# Patient Record
Sex: FEMALE | Race: BLACK OR AFRICAN AMERICAN | NOT HISPANIC OR LATINO | Employment: FULL TIME | URBAN - METROPOLITAN AREA
[De-identification: names, ages, dates, MRNs, and addresses within clinical notes are randomized per-mention and may not be internally consistent; named-entity substitution may affect disease eponyms.]

---

## 2017-12-21 DIAGNOSIS — Z36.89 ENCOUNTER FOR FETAL ANATOMIC SURVEY: Primary | ICD-10-CM

## 2022-02-13 ENCOUNTER — HOSPITAL ENCOUNTER (EMERGENCY)
Facility: HOSPITAL | Age: 37
Discharge: HOME | End: 2022-02-13
Attending: EMERGENCY MEDICINE
Payer: COMMERCIAL

## 2022-02-13 VITALS
HEART RATE: 83 BPM | HEIGHT: 66 IN | DIASTOLIC BLOOD PRESSURE: 79 MMHG | BODY MASS INDEX: 35.08 KG/M2 | SYSTOLIC BLOOD PRESSURE: 152 MMHG | WEIGHT: 218.25 LBS | TEMPERATURE: 98 F | RESPIRATION RATE: 16 BRPM | OXYGEN SATURATION: 100 %

## 2022-02-13 DIAGNOSIS — R03.0 ELEVATED BLOOD PRESSURE READING: Primary | ICD-10-CM

## 2022-02-13 DIAGNOSIS — M25.562 KNEE PAIN, LEFT: ICD-10-CM

## 2022-02-13 LAB — HEP C VIRUS HOLD SPECIMEN: NORMAL

## 2022-02-13 PROCEDURE — 25000003 PHARM REV CODE 250: Mod: ER | Performed by: EMERGENCY MEDICINE

## 2022-02-13 PROCEDURE — 99284 EMERGENCY DEPT VISIT MOD MDM: CPT | Mod: 25,ER

## 2022-02-13 PROCEDURE — 29505 APPLICATION LONG LEG SPLINT: CPT | Mod: LT,ER

## 2022-02-13 PROCEDURE — 87389 HIV-1 AG W/HIV-1&-2 AB AG IA: CPT | Performed by: EMERGENCY MEDICINE

## 2022-02-13 RX ORDER — IBUPROFEN 600 MG/1
600 TABLET ORAL EVERY 6 HOURS PRN
Qty: 40 TABLET | Refills: 0 | OUTPATIENT
Start: 2022-02-13 | End: 2022-06-09

## 2022-02-13 RX ORDER — KETOROLAC TROMETHAMINE 10 MG/1
10 TABLET, FILM COATED ORAL
Status: COMPLETED | OUTPATIENT
Start: 2022-02-13 | End: 2022-02-13

## 2022-02-13 RX ADMIN — KETOROLAC TROMETHAMINE 10 MG: 10 TABLET, FILM COATED ORAL at 08:02

## 2022-02-13 NOTE — ED PROVIDER NOTES
History     Chief Complaint   Patient presents with    Knee Pain     Mihir knee pain, left >right       Review of patient's allergies indicates:   Allergen Reactions    Codeine Anaphylaxis    Hydrocodone-acetaminophen Swelling     THROAT CLOSURE  THROAT CLOSURE      Oxycodone-acetaminophen Swelling     THROAT CLOSURE  THROAT CLOSURE         History of Present Illness   HPI    2/13/2022, 7:20 AM  The history is provided by the patient    Alpa Oliveros is a 36 y.o. female presenting to the ED for bilateral knee pain, (L>R).  Patient reports that she was attempting to get up and work when her left leg gave out.  She denies falling.  The pain is located mostly to the left knee.  Although she complains of bilateral knee pain.  Pain is rated 8/10.  No prior treatment.  Trying to bend the knee makes it worse.  Keeping it straight makes better.  Patient denies any back pain, calf pain, calf tenderness,  loss of control of bowel or bladder, fever, chest pain, chest pressure, nausea, vomiting.      Arrival mode:  Personal Vehicle    PCP: Neftaly Barker MD     Allergies:  Review of patient's allergies indicates:   Allergen Reactions    Codeine Anaphylaxis    Hydrocodone-acetaminophen Swelling     THROAT CLOSURE  THROAT CLOSURE      Oxycodone-acetaminophen Swelling     THROAT CLOSURE  THROAT CLOSURE         Past Medical History:  History reviewed. No pertinent past medical history.    Past Surgical History:  Past Surgical History:   Procedure Laterality Date    hysterectomy      INCISIONAL HERNIA REPAIR           Family History:  History reviewed. No pertinent family history.    Social History:  Social History     Tobacco Use    Smoking status: Never Smoker    Smokeless tobacco: Not on file   Substance and Sexual Activity    Alcohol use: Never    Drug use: Never    Sexual activity: Not on file        Review of Systems   Review of Systems   Constitutional: Negative for fever.   HENT: Negative for sore throat.     Respiratory: Negative for shortness of breath.    Cardiovascular: Negative for chest pain.   Gastrointestinal: Negative for nausea.   Genitourinary: Negative for dysuria.   Musculoskeletal: Positive for arthralgias (Bilateral knee pain). Negative for back pain and neck pain.   Skin: Negative for rash.   Neurological: Negative for weakness.   Hematological: Does not bruise/bleed easily.          Physical Exam     Initial Vitals [02/13/22 0729]   BP Pulse Resp Temp SpO2   (!) 152/79 83 16 98.1 °F (36.7 °C) 100 %      MAP       --          Physical Exam  Musculoskeletal:      Left upper leg: No swelling or deformity.      Right knee: Bony tenderness present. No swelling or ecchymosis. Normal range of motion. No tenderness. No LCL laxity, MCL laxity, ACL laxity or PCL laxity. Normal pulse.      Left knee: Bony tenderness and crepitus present. No swelling, deformity, effusion, erythema or ecchymosis. Decreased range of motion (Painful to flex the left knee). Tenderness present over the lateral joint line. LCL laxity present.      Right ankle: Normal.      Right Achilles Tendon: Normal.      Left ankle: Normal. No deformity. No tenderness. Normal range of motion.      Left Achilles Tendon: Normal.      Left foot: Normal. No swelling or deformity. Normal pulse.         Nursing Notes and Vital Signs Reviewed.  Constitutional: Patient is in no apparent distress. Well-developed and well-nourished.  Head: Atraumatic. Normocephalic.  Eyes: PERRL. EOM intact. Conjunctivae are not pale. No scleral icterus.  ENT: Mucous membranes are moist. Oropharynx is clear and symmetric.    Neck: Supple. Full ROM. No lymphadenopathy.  Cardiovascular: Regular rate. Regular rhythm. No murmurs, rubs, or gallops. Distal pulses are 2+ and symmetric.  Pulmonary/Chest: No respiratory distress. Clear to auscultation bilaterally. No wheezing or rales.  Abdominal: Soft and non-distended.  There is no tenderness.  No rebound, guarding, or rigidity.  "Good bowel sounds.  Genitourinary: No CVA tenderness  Musculoskeletal: Moves all extremities. No obvious deformities. No edema. No calf tenderness.  Patient walks with an antalgic gait.  Skin: Warm and dry.  Neurological:  Alert, awake, and appropriate.  Normal speech.  No acute focal neurological deficits are appreciated.  Psychiatric: Normal affect. Good eye contact. Appropriate in content.     ED Course     ED Procedures:  Orthopedic Injury    Date/Time: 2/13/2022 9:29 AM  Performed by: Kasandra Dai DO  Authorized by: Kasandra Dai DO     Location procedure was performed:  [facility] EMERGENCY DEPARTMENT  Injury:     Injury location:  Knee    Location details:  Left knee      Pre-procedure assessment:     Neurovascular status: Neurovascularly intact      Distal perfusion: normal      Neurological function: normal      Range of motion: reduced        Selections made in this section will also lock the Injury type section above.:     Immobilization:  Splint    Splint type: Commercial Knee Immobilizer.  Post-procedure assessment:     Neurovascular status: Neurovascularly intact      Distal perfusion: normal      Neurological function: normal      Range of motion: splinted      Patient tolerance:  Patient tolerated the procedure well with no immediate complications        ED Vital Signs:  Vitals:    02/13/22 0729   BP: (!) 152/79   Pulse: 83   Resp: 16   Temp: 98.1 °F (36.7 °C)   TempSrc: Oral   SpO2: 100%   Weight: 99 kg (218 lb 4.1 oz)   Height: 5' 6" (1.676 m)       Abnormal Lab Results:  Labs Reviewed   HIV 1 / 2 ANTIBODY   HEPATITIS C ANTIBODY   HEP C VIRUS HOLD SPECIMEN        All Lab Results:    none      Imaging Results:  Imaging Results          CT Knee Without Contrast Left (Final result)  Result time 02/13/22 09:31:16    Final result by Sameer Brown MD (02/13/22 09:31:16)                 Impression:      1.  Negative for acute fracture involving the left knee, especially the lateral left " femur.    2.  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    3.  Moderate degenerative changes of the patellofemoral compartment with significant lateral tilting of the patella, as well as subchondral cyst in the patella.  Small adjacent effusion.    All CT scans at this facility are performed  using dose modulation techniques as appropriate to performed exam including the following:  automated exposure control; adjustment of mA and/or kV according to the patients size (this includes techniques or standardized protocols for targeted exams where dose is matched to indication/reason for exam: i.e. extremities or head);  iterative reconstruction technique.      Electronically signed by: Sameer Brown MD  Date:    02/13/2022  Time:    09:31             Narrative:    EXAMINATION:  CT KNEE WITHOUT CONTRAST LEFT    CLINICAL HISTORY:  Fracture, knee;No hx of trauma, left knee pain;    TECHNIQUE:  Axial images through the right knee were obtained without the use of IV contrast.  Sagittal and coronal reconstructions are provided for review.    COMPARISON:  Plain films performed earlier the same day    FINDINGS:  The lucency seen on the comparison plain films represent a well corticated accessory ossicle along the inferior margin of the patella.  The femur is intact.  No definite acute fracture is seen involving the distal femur, patella, proximal tibia or proximal fibula.    Moderate lateral tilting of the patella noted.  There is a tiny amount of fluid in the left knee joint.  Patellar spurring noted.  Subchondral cyst involves the patellar surface as well.    The surrounding soft tissues are normal.                               X-Ray Knee Complete 4 or More Views Left (Final result)  Result time 02/13/22 08:12:34    Final result by Ale Puckett MD (Timothy) (02/13/22 08:12:34)                  Impression:      See above      Electronically signed by: Ale Puckett MD  Date:    02/13/2022  Time:    08:12             Narrative:    EXAMINATION:  XR KNEE COMP 4 OR MORE VIEWS LEFT    CLINICAL HISTORY:  Pain in left knee    TECHNIQUE:  Standard radiography performed.    COMPARISON:  None    FINDINGS:  Linear lucency seen on the oblique film involving the lateral femoral condyle could represent a incomplete nondisplaced fracture.  Correlate for point tenderness.                                      The Emergency Provider reviewed the vital signs and test results, which are outlined above.     ED Discussion      9:47 AM  Reassessment: Dr. Dai reassessed the pt.  Pain is improved.  The pt is resting comfortably and is NAD.  Pt states their sx have improved. Discussed test results, shared treatment plan, specific conditions for return, and the need for f/u.  Answered their questions at this time.  Pt understands and agrees to the plan.  The pt has remained hemodynamically stable through ED course and is stable for discharge.      I discussed with patient and/or family/caretaker that evaluation in the ED does not suggest any emergent or life threatening medical conditions requiring immediate intervention beyond what was provided in the ED, and I believe patient is safe for discharge.  Regardless, an unremarkable evaluation in the ED does not preclude the development or presence of a serious of life threatening condition. As such, patient was instructed to return immediately for any worsening or change in current symptoms.      ED Medication(s):  Medications   ketorolac tablet 10 mg (10 mg Oral Given 2/13/22 0817)             MIPS Measures     Smoker? No     Hypertension: Pre-hypertension/Hypertension: The pt has been informed that they may have pre-hypertension or hypertension based on a blood pressure reading in the ED. I recommend that the pt call the PCP listed on their discharge instructions or a  "physician of their choice this week to arrange f/u for further evaluation of possible pre-hypertension or hypertension.        Medical Decision Making                 MDM  Reviewed: vitals and nursing note  Interpretation: x-ray and CT scan          Portions of this note may have been created with voice recognition software. Occasional "wrong-word" or "sound-a-like" substitutions may have occurred due to the inherent limitations of voice recognition software. Please, read the note carefully and recognize, using context, where substitutions have occurred.            Clinical Impression       ICD-10-CM ICD-9-CM   1. Elevated blood pressure reading  R03.0 796.2   2. Knee pain, left  M25.562 719.46         ED Disposition       Disposition: Discharge to home  Patient condition: Stable    Medication List     Medication List      START taking these medications    ibuprofen 600 MG tablet  Commonly known as: ADVIL,MOTRIN  Take 1 tablet (600 mg total) by mouth every 6 (six) hours as needed.           Where to Get Your Medications      You can get these medications from any pharmacy    Bring a paper prescription for each of these medications  · ibuprofen 600 MG tablet         ED Follow-up   Follow-up Information     Manuel Davis MD In 2 days.    Specialty: Orthopedic Surgery  Why: Return to emergency department for calf pain, calf tenderness, numbness or tingling of the toes, chest pain, chest pressure, shortness of breath, or other concerns  Contact information:  [address] [place] [address] [address] Kevyn [address]ARD [address] [phone]                                  Kasandra Dai, DO  02/13/22 0986       Kasandra Dai, DO  02/13/22 1024       Kasandra Dai, DO  03/05/22 1541    "

## 2022-02-14 ENCOUNTER — TELEPHONE (OUTPATIENT)
Dept: ORTHOPEDICS | Facility: CLINIC | Age: 37
End: 2022-02-14
Payer: COMMERCIAL

## 2022-02-14 LAB — HIV 1+2 AB+HIV1 P24 AG SERPL QL IA: NEGATIVE

## 2022-02-15 ENCOUNTER — TELEPHONE (OUTPATIENT)
Dept: ORTHOPEDICS | Facility: CLINIC | Age: 37
End: 2022-02-15
Payer: COMMERCIAL

## 2022-02-15 NOTE — TELEPHONE ENCOUNTER
Spoke with patient and scheduled her ER follow up appointment. Understanding verbalized of appointment date, time and location.      RE: Referral  Received: Today  DIGNA Bhagat MA  Wednesday or Friday           Previous Messages       ----- Message -----   From: Cori Garcia MA   Sent: 2/14/2022   4:17 PM CST   To: Serge Perez PA-C   Subject: FW: Referral                                     When should patient be scheduled?   ----- Message -----   From: Karen Fan   Sent: 2/14/2022  11:30 AM CST   To: Fabienne Oliveros - Ortho Trauma   Subject: Referral                                         Good morning,     I have received this orthopedic referral which needs to be seen in ortho trauma clinic. Can you please schedule this patient with your department? Please let me know if you need anything from me.     Thank you,   Karen Fan

## 2022-02-16 ENCOUNTER — OFFICE VISIT (OUTPATIENT)
Dept: ORTHOPEDICS | Facility: CLINIC | Age: 37
End: 2022-02-16
Payer: COMMERCIAL

## 2022-02-16 VITALS
HEART RATE: 85 BPM | HEIGHT: 63 IN | BODY MASS INDEX: 38.67 KG/M2 | DIASTOLIC BLOOD PRESSURE: 77 MMHG | WEIGHT: 218.25 LBS | SYSTOLIC BLOOD PRESSURE: 126 MMHG

## 2022-02-16 DIAGNOSIS — M22.2X1 BILATERAL PATELLOFEMORAL SYNDROME: Primary | ICD-10-CM

## 2022-02-16 DIAGNOSIS — M22.2X2 BILATERAL PATELLOFEMORAL SYNDROME: Primary | ICD-10-CM

## 2022-02-16 PROCEDURE — 99204 OFFICE O/P NEW MOD 45 MIN: CPT | Mod: S$GLB,,, | Performed by: PHYSICIAN ASSISTANT

## 2022-02-16 PROCEDURE — 99999 PR PBB SHADOW E&M-EST. PATIENT-LVL III: ICD-10-PCS | Mod: PBBFAC,,, | Performed by: PHYSICIAN ASSISTANT

## 2022-02-16 PROCEDURE — 99999 PR PBB SHADOW E&M-EST. PATIENT-LVL III: CPT | Mod: PBBFAC,,, | Performed by: PHYSICIAN ASSISTANT

## 2022-02-16 PROCEDURE — 99204 PR OFFICE/OUTPT VISIT, NEW, LEVL IV, 45-59 MIN: ICD-10-PCS | Mod: S$GLB,,, | Performed by: PHYSICIAN ASSISTANT

## 2022-02-16 NOTE — PROGRESS NOTES
"  Subjective:      Patient ID: Alpa Oliveros is a 36 y.o. female.    Chief Complaint: Pain of the Left Knee and Pain of the Right Knee      HPI: Alpa Oliveros is a 36-year-old female in clinic today for evaluation of bilateral knee pain.  Patient reports his pain has been ongoing for over a year now, but it has recently worsened.  She reports pain with going up and down stairs, standing from a seated position.  She reports that she feels cracking popping in her knees with flexion/extension.  No history of trauma associated with this injury.  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 No pertinent past medical history.    Current Outpatient Medications:     ibuprofen (ADVIL,MOTRIN) 600 MG tablet, Take 1 tablet (600 mg total) by mouth every 6 (six) hours as needed., Disp: 40 tablet, Rfl: 0  Review of patient's allergies indicates:   Allergen Reactions    Codeine Anaphylaxis    Hydrocodone-acetaminophen Swelling     THROAT CLOSURE  THROAT CLOSURE      Oxycodone-acetaminophen Swelling     THROAT CLOSURE  THROAT CLOSURE         /77 (BP Location: Right arm, Patient Position: Sitting, BP Method: Medium (Automatic))   Pulse 85   Ht 5' 3" (1.6 m)   Wt 99 kg (218 lb 4.1 oz)   BMI 38.66 kg/m²     ROS      Objective:    Ortho Exam   Bilateral knees:  No TTP  No edema  No effusion  ROM full  No laxity noted  Motor exam normal  Sensation pulses intact    GEN: Well developed, well nourished female. AAOX3. No acute distress.   Normocephalic, atraumatic.   ERNESTO  Breathing unlabored.  Mood and affect appropriate.        Assessment:     Imaging:  X-ray left knee obtained in the emergency department shows no acute fracture or dislocation. Patella is sitting further laterally        1. Bilateral patellofemoral syndrome          Plan:       Referral for therapy sent to [facility] in [place]. Reviewed radiographs with patient and explained there is no " fracture. Patient will follow-up as needed    Orders Placed This Encounter    Ambulatory referral/consult to Physical/Occupational Therapy     Follow up if symptoms worsen or fail to improve.

## 2022-06-09 ENCOUNTER — HOSPITAL ENCOUNTER (EMERGENCY)
Facility: HOSPITAL | Age: 37
Discharge: HOME | End: 2022-06-09
Attending: FAMILY MEDICINE
Payer: COMMERCIAL

## 2022-06-09 VITALS
RESPIRATION RATE: 16 BRPM | TEMPERATURE: 98 F | HEART RATE: 88 BPM | WEIGHT: 239.63 LBS | OXYGEN SATURATION: 100 % | SYSTOLIC BLOOD PRESSURE: 128 MMHG | HEIGHT: 62 IN | DIASTOLIC BLOOD PRESSURE: 66 MMHG | BODY MASS INDEX: 44.1 KG/M2

## 2022-06-09 DIAGNOSIS — M54.31 SCIATICA OF RIGHT SIDE: Primary | ICD-10-CM

## 2022-06-09 PROCEDURE — 96372 THER/PROPH/DIAG INJ SC/IM: CPT | Performed by: NURSE PRACTITIONER

## 2022-06-09 PROCEDURE — 63600175 PHARM REV CODE 636 W HCPCS: Performed by: NURSE PRACTITIONER

## 2022-06-09 PROCEDURE — 99284 EMERGENCY DEPT VISIT MOD MDM: CPT

## 2022-06-09 RX ORDER — KETOROLAC TROMETHAMINE 30 MG/ML
60 INJECTION, SOLUTION INTRAMUSCULAR; INTRAVENOUS
Status: COMPLETED | OUTPATIENT
Start: 2022-06-09 | End: 2022-06-09

## 2022-06-09 RX ORDER — METHOCARBAMOL 750 MG/1
750 TABLET, FILM COATED ORAL 4 TIMES DAILY
Qty: 40 TABLET | Refills: 0 | Status: SHIPPED | OUTPATIENT
Start: 2022-06-09 | End: 2022-06-19

## 2022-06-09 RX ORDER — ORPHENADRINE CITRATE 30 MG/ML
60 INJECTION INTRAMUSCULAR; INTRAVENOUS
Status: COMPLETED | OUTPATIENT
Start: 2022-06-09 | End: 2022-06-09

## 2022-06-09 RX ORDER — DEXAMETHASONE SODIUM PHOSPHATE 4 MG/ML
8 INJECTION, SOLUTION INTRA-ARTICULAR; INTRALESIONAL; INTRAMUSCULAR; INTRAVENOUS; SOFT TISSUE
Status: COMPLETED | OUTPATIENT
Start: 2022-06-09 | End: 2022-06-09

## 2022-06-09 RX ORDER — IBUPROFEN 800 MG/1
800 TABLET ORAL EVERY 6 HOURS PRN
Qty: 20 TABLET | Refills: 0 | Status: SHIPPED | OUTPATIENT
Start: 2022-06-09

## 2022-06-09 RX ADMIN — DEXAMETHASONE SODIUM PHOSPHATE 8 MG: 4 INJECTION INTRA-ARTICULAR; INTRALESIONAL; INTRAMUSCULAR; INTRAVENOUS; SOFT TISSUE at 01:06

## 2022-06-09 RX ADMIN — KETOROLAC TROMETHAMINE 60 MG: 30 INJECTION, SOLUTION INTRAMUSCULAR at 01:06

## 2022-06-09 RX ADMIN — ORPHENADRINE CITRATE 60 MG: 30 INJECTION INTRAMUSCULAR; INTRAVENOUS at 01:06

## 2022-06-09 NOTE — ED PROVIDER NOTES
Encounter Date: 6/9/2022       History     Chief Complaint   Patient presents with    Back Pain     C/o lower R back pain that shoots down to ankle.      Patient is a 36-year-old female presents with complaints of right sided lower back pain that radiates down the right leg.  Onset of symptoms was today.  Denies any injury to the area.  Denies any bowel or bladder incontinence.  Denies any saddle anesthesia or bilateral lower extremity numbness or weakness.  No medications taken for relief of symptoms.  No distress noted this time.        Review of patient's allergies indicates:   Allergen Reactions    Codeine Anaphylaxis    Hydrocodone-acetaminophen Swelling     THROAT CLOSURE  THROAT CLOSURE      Oxycodone-acetaminophen Swelling     THROAT CLOSURE  THROAT CLOSURE       History reviewed. No pertinent past medical history.  Past Surgical History:   Procedure Laterality Date    hysterectomy      INCISIONAL HERNIA REPAIR       History reviewed. No pertinent family history.  Social History     Tobacco Use    Smoking status: Never Smoker    Smokeless tobacco: Never Used   Substance Use Topics    Alcohol use: Never    Drug use: Never     Review of Systems   Constitutional: Negative for fever.   HENT: Negative for sore throat.    Respiratory: Negative for shortness of breath.    Cardiovascular: Negative for chest pain.   Gastrointestinal: Negative for nausea.   Genitourinary: Negative for dysuria.   Musculoskeletal: Positive for back pain (Right lower).   Skin: Negative for rash.   Neurological: Negative for weakness.   Hematological: Does not bruise/bleed easily.       Physical Exam     Initial Vitals [06/09/22 0141]   BP Pulse Resp Temp SpO2   126/77 92 14 -- 99 %      MAP       --         Physical Exam    Nursing note and vitals reviewed.  Constitutional: She appears well-developed and well-nourished.   HENT:   Head: Normocephalic and atraumatic.   Eyes: EOM are normal. Pupils are equal, round, and reactive  to light.   Neck: Neck supple.   Normal range of motion.  Cardiovascular: Normal rate, regular rhythm, normal heart sounds and intact distal pulses.   Pulmonary/Chest: Breath sounds normal.   Abdominal: Abdomen is soft. Bowel sounds are normal.   Musculoskeletal:      Cervical back: Normal, normal range of motion and neck supple.      Thoracic back: Normal.      Lumbar back: Tenderness (Right lower) present. No bony tenderness. Decreased range of motion.     Neurological: She is alert and oriented to person, place, and time. She has normal strength and normal reflexes.   Skin: Skin is warm and dry.         ED Course   Procedures  Labs Reviewed - No data to display       Imaging Results    None          Medications   orphenadrine injection 60 mg (has no administration in time range)   ketorolac injection 60 mg (has no administration in time range)   dexamethasone injection 8 mg (has no administration in time range)     Medical Decision Making:   ED Management:  I discussed with patient and/or family/caretaker that evaluation in the ED does not suggest any emergent or life threatening medical conditions requiring immediate intervention beyond what was provided in the ED, and I believe patient is safe for discharge. Regardless, an unremarkable evaluation in the ED does not preclude the development or presence of a serious of life threatening condition. As such, patient was instructed to return immediately for any worsening or change in current symptoms.                        Clinical Impression:   Final diagnoses:  [M54.31] Sciatica of right side (Primary)          ED Disposition Condition    Discharge Stable        ED Prescriptions     Medication Sig Dispense Start Date End Date Auth. Provider    methocarbamoL (ROBAXIN) 750 MG Tab Take 1 tablet (750 mg total) by mouth 4 (four) times daily. for 10 days 40 tablet 6/9/2022 6/19/2022 Alex Chopra NP    ibuprofen (ADVIL,MOTRIN) 800 MG tablet Take 1 tablet (800 mg  total) by mouth every 6 (six) hours as needed for Pain. 20 tablet 6/9/2022  Alex Chopra NP        Follow-up Information     Follow up With Specialties Details Why Contact Info    Neftaly Barker MD Obstetrics and Gynecology  As needed [address] [place] [address]  [phone]             Alex Chopra NP  06/09/22 0154

## 2023-03-14 NOTE — TELEPHONE ENCOUNTER
Spoke with patient and informed her that I would have the physician or physician's assistant review their xray to let me know when to schedule their follow up appointment and I will call back to schedule. Patient verbalized understanding.     clear to auscultation bilaterally/no respiratory distress/respirations non-labored